# Patient Record
(demographics unavailable — no encounter records)

---

## 2025-06-02 NOTE — HISTORY OF PRESENT ILLNESS
[FreeTextEntry1] : Translator Mcgarry [id number]    Pt was at [facility] 2025: 26F s/p c section. Admission complicated by pt becoming hypotensive to 70/40s, desaturating to 70-80s, and altered mental status while in the pacu. Pt required pressors for BP control, BP increased 104/68 and her mental status improved.  Pt reported having chest pressure during hypotensive episode. Cardiology consulted for CP and elevated troponins.    Chest pain: resolved, Elevated troponin: demand ischemia [facility] showed no SCAD. Hemorrhagic Shock: s/p PRBC, stayed in ICU, received pressors Reverse stress CM:  reversed and LVEF normalized Acute HFrEF:  LVEF normalized  - Patient S/P  2025, EBL 900cc, with symptomatic Chest pressure associated with Hypotension and hypoxia, revieved 2 units of PRBC - hsTnT 291; 427; 229; 136; 118 - PBNP 274; 830; 2916; 2991; 2090 - EKG NSR x2 with no ischemic changes - Pt stayed at SICU for hemorrhagic shock management - s/p 2 units of pRBCs - S/p CTA Abd/pelvis: Enlarged hyperemic uterus, small simple ascites, and small pneumoperitoneum, consistent with recent . 2.5 cm complex collection posterior to the uterus. - S/p CTA Chest: No pulmonary embolus. Extensive bilateral ground-glass and consolidative opacities, likely pulmonary edema. - TTE 2025 TDS. LV siz nl. LVEF 40-45. he basal and mid inferolateral wall, basal inferoseptal segment, and basal inferior segment are akinetic. Mild to mode MR. mild TR. 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 No WMA. Mml-Mod MR. - GDMT deferred at that time due to Asymptomatic hypotension   TTE 1015 LVEF 55-60. Mild MRLula HUFFMAN Feels ell. Denied dizziness, dyspnea, orthopnea, PND, edema, CP, palpitation, nausea, vomiting, bleeding, syncope, near syncope.   FUNCTIONAL CAPACITY Est .4.0 METS  CARDIAC MEDS Denied  CHRONIC, STABLE CONDITIONS Denied  FAMILY HISTORY Denied FHx CVD  CARIDAC TESTING